# Patient Record
Sex: FEMALE | Race: WHITE
[De-identification: names, ages, dates, MRNs, and addresses within clinical notes are randomized per-mention and may not be internally consistent; named-entity substitution may affect disease eponyms.]

---

## 2018-09-16 ENCOUNTER — HOSPITAL ENCOUNTER (EMERGENCY)
Dept: HOSPITAL 25 - ED | Age: 43
Discharge: HOME | End: 2018-09-16
Payer: COMMERCIAL

## 2018-09-16 VITALS — SYSTOLIC BLOOD PRESSURE: 137 MMHG | DIASTOLIC BLOOD PRESSURE: 86 MMHG

## 2018-09-16 DIAGNOSIS — M70.62: Primary | ICD-10-CM

## 2018-09-16 DIAGNOSIS — M79.652: ICD-10-CM

## 2018-09-16 DIAGNOSIS — M25.552: ICD-10-CM

## 2018-09-16 PROCEDURE — 99284 EMERGENCY DEPT VISIT MOD MDM: CPT

## 2018-09-16 NOTE — ED
Lower Extremity





- HPI Summary


HPI Summary: 





This patient is a 43 year old F presenting to [facility] c/o left hip pain that 

began today. Pt states she was sitting at work when she had a sudden onset of 

pain that got worse through-out the day. The patient rates the pain 8/10 in 

severity. Symptoms aggravated by walking and going up stairs. Patient reports 

hamstring swelling. Patient denies injury. Pt states she has not done a lot of 

physical labor or physical activity. Hx of trochanteric bursitis 





- History of Current Complaint


Chief Complaint: EDExtremityLower


Stated Complaint: RIGHT HIP/THIGH PAIN


Time Seen by Provider: 09/16/18 21:16


Hx Obtained From: Patient


Hx Last Menstrual Period: 10/3/12


Mechanism Of Injury: Other - none


Onset of Pain: Immediate


Onset/Duration: Still Present


Severity Initially: Moderate


Severity Currently: Severe


Pain Intensity: 8


Pain Scale Used: 0-10 Numeric


Timing: Constant


Location: Is Discrete @ - right hip


Associated Signs And Symptoms: Positive: Swelling - hamstring


Able to Bear Weight: Yes





- Allergies/Home Medications


Allergies/Adverse Reactions: 


 Allergies











Allergy/AdvReac Type Severity Reaction Status Date / Time


 


No Known Allergies Allergy   Verified 09/16/18 20:56














PMH/Surg Hx/FS Hx/Imm Hx


Endocrine/Hematology History: 


   Denies: Hx Blood Transfusions, Hx Bone Marrow Disease, Hx Diabetes, Hx 

Thyroid Disease


 History: 


   Denies: Hx Chronic Renal Failure


Musculoskeletal History: Reports: Other Musculoskeletal History - trochanteric 

bursitis 


Neurological History: 


   Denies: Hx CVA, Hx Transient Ischemic Attacks (TIA)





- Surgical History


Surgery Procedure, Year, and Place: cyst removed from left side face and bone 

graft 2010


Infectious Disease History: No


Infectious Disease History: 


   Denies: Traveled Outside the [place] in Last 30 Days





- Family History


Known Family History: Positive: Hypertension





- Social History


Occupation: Employed Full-time


Lives: With Family


Alcohol Use: Rare


Substance Use Type: Reports: None


Smoking Status (MU): Light Every Day Tobacco Smoker





Review of Systems


Negative: Fever


Positive: Edema, Other - right hip pain 


All Other Systems Reviewed And Are Negative: Yes





Physical Exam





- Summary


Physical Exam Summary: 





Appearance: Well-appearing, Well-nourished, lying in bed comfortably


Skin: Warm, dry, no obvious rash


Eyes: sclera anicteric, no conjunctival pallor


ENT: mucous membranes moist, pharynx appears normal


Neck: Supple, nontender


Respiratory: Clear to auscultation, no signs of respiratory distress


Cardiovascular: Normal S1, S2. No murmurs. Normal distal pulses in tibial and 

radial bilaterally.


Abdomen: Soft, nontender, normal active bowel sounds present


Musculoskeletal: TTP over the greater trochanter of the right femur,, Strength/

ROM Intact


Neurological: A&Ox3, awake and alert, mentation is normal, speech is fluent and 

appropriate


Psychiatric: affect is normal, does not appear anxious or depressed





Triage Information Reviewed: Yes


Vital Signs On Initial Exam: 


 Initial Vitals











Temp Pulse Resp BP Pulse Ox


 


 98.7 F   106   16   161/95   98 


 


 09/16/18 20:53  09/16/18 20:53  09/16/18 20:53  09/16/18 20:53  09/16/18 20:53











Vital Signs Reviewed: Yes





Diagnostics





- Vital Signs


 Vital Signs











  Temp Pulse Resp BP Pulse Ox


 


 09/16/18 20:53  98.7 F  106  16  161/95  98














- Laboratory


Lab Statement: Any lab studies that have been ordered have been reviewed, and 

results considered in the medical decision making process.





Lower Extremity Course/Dx





- Course


Assessment/Plan: This patient is a 43 year old F presenting to [facility] c/o left 

hip pain that began today. Pt states she was sitting at work when she had a 

sudden onset of pain that got worse through-out the day. The patient rates the 

pain 8/10 in severity. Symptoms aggravated by walking and going up stairs. 

Patient reports hamstring swelling. Patient denies injury. Pt states she has 

not done a lot of physical labor or physical activity. Hx of trochanteric 

bursitis.  The patient was instructed to rest and use OTC the pain medications. 

She states she is able to do this because she has the next two days off.  Dx.  

trochanteric bursitis.  Patient will be discharged.  The patient is agreeable 

with this plan.  





- Diagnoses


Provider Diagnoses: 


 Trochanteric bursitis








Discharge





- Sign-Out/Discharge


Documenting (check all that apply): Patient Departure





- Discharge Plan


Condition: Good


Disposition: HOME


Patient Education Materials:  Hip Bursitis (ED)


Referrals: 


Ines Rudolph MD [Primary Care Provider] - 


Additional Instructions: 


This can take a number of weeks to go away completely.  He can take over-the-

counter analgesics for the pain and try to limit any activity that makes it 

worse.  It does not seem like it is getting better and sometimes an injection 

can be done which will help, and this can be done by your orthopedic surgeon.





- Billing Disposition and Condition


Condition: GOOD


Disposition: Home





- Attestation Statements


Document Initiated by Radha: Yes


Documenting Scribe: Arsalan Trent 


Provider For Whom Radha is Documenting (Include Credential): Hector Nuno MD 


Scribe Attestation: 


I, Arsalan Trent , scribed for Hector Nuno MD  on 09/17/18 at 0210. 


Scribe Documentation Reviewed: Yes


Provider Attestation: 


The documentation as recorded by the Arsalan littlejohn  accurately reflects 

the service I personally performed and the decisions made by me, Hector Nuno MD

## 2018-10-07 ENCOUNTER — HOSPITAL ENCOUNTER (EMERGENCY)
Dept: HOSPITAL 25 - UCEAST | Age: 43
Discharge: HOME | End: 2018-10-07
Payer: COMMERCIAL

## 2018-10-07 VITALS — SYSTOLIC BLOOD PRESSURE: 154 MMHG | DIASTOLIC BLOOD PRESSURE: 85 MMHG
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
## 2018-10-07 DIAGNOSIS — Z88.9: ICD-10-CM
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
## 2018-10-07 DIAGNOSIS — S63.613A: Primary | ICD-10-CM

## 2018-10-07 PROCEDURE — G0463 HOSPITAL OUTPT CLINIC VISIT: HCPCS

## 2018-10-07 PROCEDURE — 73140 X-RAY EXAM OF FINGER(S): CPT

## 2018-10-07 PROCEDURE — 99212 OFFICE O/P EST SF 10 MIN: CPT

## 2018-10-07 NOTE — UC
Knee Pain HPI





- HPI Summary


HPI Summary: 





The patient is a 43-year-old female that slipped and fell on wet grass today 

and injured her left hand.  She occurred about 1:00.  He is right handed.  Pain 

is located around the left third MCP joint.  Denies any wrist elbow or shoulder 

pain.  She denies any other injury from the fall.  She declines anything for 

pain.





- History of Current Complaint


Chief Complaint: UCUpperExtremity


Stated Complaint: HAND INURY


Time Seen by Provider: 10/07/18 18:57


Hx Obtained From: Patient


Hx Last Menstrual Period: 9/25/18


Onset/Duration: Sudden Onset


Severity Initially: Moderate


Severity Currently: Moderate


Pain Intensity: 7


Pain Scale Used: 0-10 Numeric


Character: Aching, Throbbing


Aggravating Factor(s): Movement


Associated Signs And Symptoms: Positive: Swelling, Bruising


Able to Bear Weight: Yes





- Allergies/Home Medications


Allergies/Adverse Reactions: 


 Allergies











Allergy/AdvReac Type Severity Reaction Status Date / Time


 


medication for pink eye Allergy  Blurred Uncoded 10/07/18 18:51





   Vision  











Home Medications: 


 Home Medications





Levothyroxine TAB* [Synthroid 25 MCG TAB*] 25 mcg PO DAILY 10/07/18 [History 

Confirmed 10/07/18]











PMH/Surg Hx/FS Hx/Imm Hx


Previously Healthy: Yes





- Surgical History


Surgical History: Yes


Surgery Procedure, Year, and Place: cyst removed from left side face and bone 

graft 2010.  miniscus left leg 2014





- Family History


Known Family History: Positive: Hypertension


   Negative: Cardiac Disease, Diabetes





- Social History


Alcohol Use: Rare


Substance Use Type: None


Smoking Status (MU): Light Every Day Tobacco Smoker


When Did the Patient Quit Smoking/Using Tobacco: started again after 14 years d/

t divorce





Review of Systems


Constitutional: Negative


Skin: Negative


Eyes: Negative


ENT: Negative


Respiratory: Negative


Cardiovascular: Negative


Gastrointestinal: Negative


Genitourinary: Negative


Motor: Negative


Neurovascular: Negative


Musculoskeletal: Arthralgia


Neurological: Negative


Psychological: Negative


All Other Systems Reviewed And Are Negative: Yes





Physical Exam


Triage Information Reviewed: Yes


Appearance: Well-Appearing, No Pain Distress, Well-Nourished


Vital Signs: 


 Initial Vital Signs











Temp  99.7 F   10/07/18 18:42


 


Pulse  90   10/07/18 18:42


 


Resp  12   10/07/18 18:42


 


BP  154/85   10/07/18 18:42


 


Pulse Ox  99   10/07/18 18:42











Vital Signs Reviewed: Yes


Eyes: Positive: Conjunctiva Clear


ENT: Positive: Hearing grossly normal.  Negative: Nasal congestion, Nasal 

drainage, Trismus, Hoarse voice


Neck: Positive: Supple, Nontender, No Lymphadenopathy


Respiratory: Positive: Lungs clear, Normal breath sounds, No respiratory 

distress


Cardiovascular: Positive: RRR, Pulses Normal


Musculoskeletal: Positive: ROM Limited @ - left middle finger


Psychological Exam: Normal


Skin Exam: Normal





Diagnostics





- Radiology


  ** No standard instances


Xray Interpretation: No Acute Changes


Radiology Interpretation Completed By: ED Physician





Knee Pain Course/Dx





- Differential Dx/Diagnosis


Provider Diagnoses: LMF sprain





Discharge





- Sign-Out/Discharge


Documenting (check all that apply): Patient Departure


All imaging exams completed and their final reports reviewed: No





- Discharge Plan


Condition: Stable


Disposition: HOME


Patient Education Materials:  Finger Sprain (ED)


Referrals: 


Ines Rudolph MD [Primary Care Provider] - 5 Days (recheck in 5-10 days 

if not better)


Additional Instructions: 


ice


elevate


splint for comfort





tylenol or advil for pain





- Billing Disposition and Condition


Condition: STABLE


Disposition: Home





Images


Hands: 


  __________________________














  __________________________





 1 - tender/swollen

## 2018-10-08 NOTE — UC
- Progress Note


Progress Note: 





Left middle finger x-ray from October 7, 2018 and has been read as no fracture 

by the radiologist.


The provider who read the x-ray on October 7, 2018 also read it as no fracture 

therefore there is no discrepancy in the reads and no change in treatment.





Discharge





- Sign-Out/Discharge


Documenting (check all that apply): Patient Departure


All imaging exams completed and their final reports reviewed: Yes





- Discharge Plan


Condition: Stable


Disposition: HOME


Patient Education Materials:  Finger Sprain (ED)


Referrals: 


Ines Rudolph MD [Primary Care Provider] - 5 Days (recheck in 5-10 days 

if not better)


Additional Instructions: 


ice


elevate


splint for comfort





tylenol or advil for pain





OFFICAL XR REPORT PENDING





- Billing Disposition and Condition


Condition: STABLE


Disposition: Home

## 2018-10-08 NOTE — RAD
HISTORY: injury, pain third MCP joint



COMPARISONS: None



VIEWS: 3 , Frontal, lateral, and oblique views of the third digit of the left hand 



FINDINGS:



BONE DENSITY: Normal.

BONES: There is no displaced fracture.

JOINTS: There is no arthropathy.

ALIGNMENT: There is no dislocation. 

SOFT TISSUES: Unremarkable.



OTHER FINDINGS: None.



IMPRESSION: 

NO ACUTE OSSEOUS INJURY. IF SYMPTOMS PERSIST, RECOMMEND REPEAT IMAGING.



R0